# Patient Record
(demographics unavailable — no encounter records)

---

## 2025-07-07 NOTE — REASON FOR VISIT
[Home] : at home, [unfilled] , at the time of the visit. [Medical Office: ([facility])___] : at the medical office located in  [Telehealth (audio & video)] : This visit was provided via telehealth using real-time 2-way audio visual technology. [Verbal consent obtained from patient] : the patient, [unfilled] [Follow-Up: _____] : a [unfilled] follow-up visit

## 2025-07-07 NOTE — ASSESSMENT
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  Plan: -Transition from Lovenox to Aspirin once cleared by OBGYN -Resume Rosuvastatin 20 mg after breastfeeding due to elevated LpA; emphasized importance for long term secondary stroke prevention -Continue aggressive vascular risk factor control with PCP, BP goal <130/80, LDL goal <50 and A1c goal <6.5% -Encouraged healthy lifestyle habits including regular exercise, goal of 8 hours of sleep, adequate hydration and stress management -Counselled on signs of stroke BEFAST and to call 911 with any new or worsening neurological symptoms -RTO in 1 year with Dr. Kilgore or sooner if headaches worsen

## 2025-07-07 NOTE — HISTORY OF PRESENT ILLNESS
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  Since last visit, patient reports no new stroke-like symptoms. She recently gave birth to a healthy baby boy about 5 weeks ago. She has been on Lovenox since giving birth and plans to transition back to Aspirin once cleared by her OBGYN. She is currently breastfeeding and plans to do for at least another 6 months and therefore has not resumed her Rosuvastatin. She is hesitant to resume once cleared due to concern for long term side effects. She has been seeing a Hematologist throughout her pregnancy without any findings of increased clotting risk. She reports elevated blood pressure readings at the end of her pregnancy, for which she is now temporarily on Labetalol. She reports no recent bad headaches and can't remember the last time she has needed to take something for a tension headache such as when dehydrated, etc.

## 2025-07-07 NOTE — PHYSICAL EXAM
[FreeTextEntry1] : Exam is limited due to the nature of the telehealth visit. The patient is alert and oriented to conversation. Speech and language are intact. Cranial nerves are grossly intact. The patient is able to move all extremities.